# Patient Record
Sex: FEMALE | Race: BLACK OR AFRICAN AMERICAN | Employment: OTHER | URBAN - METROPOLITAN AREA
[De-identification: names, ages, dates, MRNs, and addresses within clinical notes are randomized per-mention and may not be internally consistent; named-entity substitution may affect disease eponyms.]

---

## 2021-03-15 ENCOUNTER — HOSPITAL ENCOUNTER (OUTPATIENT)
Dept: PREADMISSION TESTING | Age: 76
Discharge: HOME | End: 2021-03-15
Payer: MEDICARE

## 2021-03-15 PROCEDURE — U0003 INFECTIOUS AGENT DETECTION BY NUCLEIC ACID (DNA OR RNA); SEVERE ACUTE RESPIRATORY SYNDROME CORONAVIRUS 2 (SARS-COV-2) (CORONAVIRUS DISEASE [COVID-19]), AMPLIFIED PROBE TECHNIQUE, MAKING USE OF HIGH THROUGHPUT TECHNOLOGIES AS DESCRIBED BY CMS-2020-01-R: HCPCS

## 2021-03-16 LAB — SARS-COV-2, NAA: NOT DETECTED

## 2021-03-19 ENCOUNTER — ANESTHESIA (OUTPATIENT)
Dept: ENDOSCOPY | Age: 76
End: 2021-03-19
Payer: MEDICARE

## 2021-03-19 ENCOUNTER — HOSPITAL ENCOUNTER (OUTPATIENT)
Age: 76
Setting detail: OUTPATIENT SURGERY
Discharge: HOME | End: 2021-03-19
Attending: INTERNAL MEDICINE | Admitting: INTERNAL MEDICINE
Payer: MEDICARE

## 2021-03-19 ENCOUNTER — ANESTHESIA EVENT (OUTPATIENT)
Dept: ENDOSCOPY | Age: 76
End: 2021-03-19
Payer: MEDICARE

## 2021-03-19 VITALS
SYSTOLIC BLOOD PRESSURE: 121 MMHG | OXYGEN SATURATION: 100 % | DIASTOLIC BLOOD PRESSURE: 79 MMHG | WEIGHT: 255.5 LBS | BODY MASS INDEX: 48.24 KG/M2 | HEIGHT: 61 IN | RESPIRATION RATE: 16 BRPM | HEART RATE: 76 BPM | TEMPERATURE: 97 F

## 2021-03-19 PROBLEM — Z80.0 FAMILY HISTORY OF MALIGNANT NEOPLASM OF GASTROINTESTINAL TRACT: Status: ACTIVE | Noted: 2021-03-19

## 2021-03-19 PROCEDURE — 74011000250 HC RX REV CODE- 250: Performed by: NURSE ANESTHETIST, CERTIFIED REGISTERED

## 2021-03-19 PROCEDURE — 77030021593 HC FCPS BIOP ENDOSC BSC -A: Performed by: INTERNAL MEDICINE

## 2021-03-19 PROCEDURE — 74011000258 HC RX REV CODE- 258: Performed by: NURSE ANESTHETIST, CERTIFIED REGISTERED

## 2021-03-19 PROCEDURE — 88305 TISSUE EXAM BY PATHOLOGIST: CPT

## 2021-03-19 PROCEDURE — 74011250636 HC RX REV CODE- 250/636: Performed by: NURSE ANESTHETIST, CERTIFIED REGISTERED

## 2021-03-19 PROCEDURE — 2709999900 HC NON-CHARGEABLE SUPPLY: Performed by: INTERNAL MEDICINE

## 2021-03-19 PROCEDURE — 77030040361 HC SLV COMPR DVT MDII -B: Performed by: INTERNAL MEDICINE

## 2021-03-19 PROCEDURE — 76060000032 HC ANESTHESIA 0.5 TO 1 HR: Performed by: INTERNAL MEDICINE

## 2021-03-19 PROCEDURE — 76040000007: Performed by: INTERNAL MEDICINE

## 2021-03-19 PROCEDURE — 77030039961 HC KT CUST COLON BSC -D: Performed by: INTERNAL MEDICINE

## 2021-03-19 PROCEDURE — 74011250636 HC RX REV CODE- 250/636: Performed by: INTERNAL MEDICINE

## 2021-03-19 RX ORDER — LIDOCAINE HYDROCHLORIDE 20 MG/ML
INJECTION, SOLUTION EPIDURAL; INFILTRATION; INTRACAUDAL; PERINEURAL AS NEEDED
Status: DISCONTINUED | OUTPATIENT
Start: 2021-03-19 | End: 2021-03-19 | Stop reason: HOSPADM

## 2021-03-19 RX ORDER — SODIUM CHLORIDE 9 MG/ML
150 INJECTION, SOLUTION INTRAVENOUS CONTINUOUS
Status: DISCONTINUED | OUTPATIENT
Start: 2021-03-19 | End: 2021-03-19 | Stop reason: HOSPADM

## 2021-03-19 RX ORDER — PROPOFOL 10 MG/ML
INJECTION, EMULSION INTRAVENOUS AS NEEDED
Status: DISCONTINUED | OUTPATIENT
Start: 2021-03-19 | End: 2021-03-19 | Stop reason: HOSPADM

## 2021-03-19 RX ORDER — SODIUM CHLORIDE 9 MG/ML
INJECTION, SOLUTION INTRAVENOUS
Status: DISCONTINUED | OUTPATIENT
Start: 2021-03-19 | End: 2021-03-19 | Stop reason: HOSPADM

## 2021-03-19 RX ORDER — BIMATOPROST 0.1 MG/ML
1 SOLUTION/ DROPS OPHTHALMIC EVERY EVENING
COMMUNITY

## 2021-03-19 RX ORDER — GLYCOPYRROLATE 0.2 MG/ML
INJECTION INTRAMUSCULAR; INTRAVENOUS AS NEEDED
Status: DISCONTINUED | OUTPATIENT
Start: 2021-03-19 | End: 2021-03-19 | Stop reason: HOSPADM

## 2021-03-19 RX ADMIN — PROPOFOL 50 MG: 10 INJECTION, EMULSION INTRAVENOUS at 11:22

## 2021-03-19 RX ADMIN — PROPOFOL 50 MG: 10 INJECTION, EMULSION INTRAVENOUS at 11:32

## 2021-03-19 RX ADMIN — PROPOFOL 50 MG: 10 INJECTION, EMULSION INTRAVENOUS at 11:36

## 2021-03-19 RX ADMIN — PROPOFOL 50 MG: 10 INJECTION, EMULSION INTRAVENOUS at 11:19

## 2021-03-19 RX ADMIN — PROPOFOL 50 MG: 10 INJECTION, EMULSION INTRAVENOUS at 11:25

## 2021-03-19 RX ADMIN — GLYCOPYRROLATE 0.2 MG: 0.2 INJECTION INTRAMUSCULAR; INTRAVENOUS at 11:12

## 2021-03-19 RX ADMIN — GLYCOPYRROLATE 0.2 MG: 0.2 INJECTION INTRAMUSCULAR; INTRAVENOUS at 11:25

## 2021-03-19 RX ADMIN — PROPOFOL 50 MG: 10 INJECTION, EMULSION INTRAVENOUS at 11:28

## 2021-03-19 RX ADMIN — LIDOCAINE HYDROCHLORIDE 100 MG: 20 INJECTION, SOLUTION EPIDURAL; INFILTRATION; INTRACAUDAL; PERINEURAL at 11:17

## 2021-03-19 RX ADMIN — SODIUM CHLORIDE 150 ML/HR: 900 INJECTION, SOLUTION INTRAVENOUS at 10:42

## 2021-03-19 RX ADMIN — SODIUM CHLORIDE: 900 INJECTION, SOLUTION INTRAVENOUS at 11:06

## 2021-03-19 NOTE — DISCHARGE INSTRUCTIONS
High fiber diet  Resume prior meds  No follow up colonoscopy recommended   jde      DISCHARGE SUMMARY from Nurse    The following personal items collected during your admission are returned to you:   Dental Appliance: Dental Appliances: Lowers, Uppers, With patient  Vision: Visual Aid: Glasses, At home  Hearing Aid:    Jewelry:    Clothing:    Other Valuables:    Valuables sent to safe:              PATIENT INSTRUCTIONS:    After general anesthesia or intravenous sedation, for 24 hours or while taking prescription Narcotics:  · Limit your activities  · Do not drive and operate hazardous machinery  · Do not make important personal or business decisions  · Do  not drink alcoholic beverages  · If you have not urinated within 8 hours after discharge, please contact your surgeon on call. Report the following to your surgeon:  · Excessive pain, swelling, redness or odor of or around the surgical area  · Temperature over 100.5  · Nausea and vomiting lasting longer than 4 hours or if unable to take medications  · Any signs of decreased circulation or nerve impairment to extremity: change in color, persistent  numbness, tingling, coldness or increase pain  · Any questions      Follow-up Appointments   Procedures    FOLLOW UP VISIT Appointment in: Other (Specify) prn     prn     Standing Status:   Standing     Number of Occurrences:   1     Order Specific Question:   Appointment in     Answer: Other (Specify)       What to do at Home:  Recommended activity: as tolerated,     If you experience any of the following symptoms as above, please follow up with Dr. Edil Waller. *  Please give a list of your current medications to your Primary Care Provider. *  Please update this list whenever your medications are discontinued, doses are      changed, or new medications (including over-the-counter products) are added. *  Please carry medication information at all times in case of emergency situations.           These are general instructions for a healthy lifestyle:    No smoking/ No tobacco products/ Avoid exposure to second hand smoke    Surgeon General's Warning:  Quitting smoking now greatly reduces serious risk to your health. Obesity, smoking, and sedentary lifestyle greatly increases your risk for illness    A healthy diet, regular physical exercise & weight monitoring are important for maintaining a healthy lifestyle    You may be retaining fluid if you have a history of heart failure or if you experience any of the following symptoms:  Weight gain of 3 pounds or more overnight or 5 pounds in a week, increased swelling in our hands or feet or shortness of breath while lying flat in bed. Please call your doctor as soon as you notice any of these symptoms; do not wait until your next office visit. Recognize signs and symptoms of STROKE:    F-face looks uneven    A-arms unable to move or move unevenly    S-speech slurred or non-existent    T-time-call 911 as soon as signs and symptoms begin-DO NOT go       Back to bed or wait to see if you get better-TIME IS BRAIN. The discharge information has been reviewed with the patient and caregiver. The patient and caregiver verbalized understanding. Warning Signs of HEART ATTACK     Call 911 if you have these symptoms:   Chest discomfort. Most heart attacks involve discomfort in the center of the chest that lasts more than a few minutes, or that goes away and comes back. It can feel like uncomfortable pressure, squeezing, fullness, or pain.  Discomfort in other areas of the upper body. Symptoms can include pain or discomfort in one or both arms, the back, neck, jaw, or stomach.  Shortness of breath with or without chest discomfort.  Other signs may include breaking out in a cold sweat, nausea, or lightheadedness. Don't wait more than five minutes to call 911 - MINUTES MATTER! Fast action can save your life.  Calling 911 is almost always the fastest way to get lifesaving treatment. Emergency Medical Services staff can begin treatment when they arrive -- up to an hour sooner than if someone gets to the hospital by car. The discharge information has been reviewed with the patient and caregiver. The patient and caregiver verbalized understanding. Discharge medications reviewed with the patient and guardian and appropriate educational materials and side effects teaching were provided.     Patient armband removed and shredded

## 2021-03-19 NOTE — ANESTHESIA POSTPROCEDURE EVALUATION
Procedure(s):  COLONOSCOPY WITH MAC; POLYPECTOMY. MAC    Anesthesia Post Evaluation      Multimodal analgesia: multimodal analgesia used between 6 hours prior to anesthesia start to PACU discharge  Patient location during evaluation: bedside  Patient participation: complete - patient participated  Level of consciousness: awake  Pain management: adequate  Airway patency: patent  Anesthetic complications: no  Cardiovascular status: acceptable  Respiratory status: acceptable  Hydration status: acceptable  Post anesthesia nausea and vomiting:  none  Final Post Anesthesia Temperature Assessment:  Normothermia (36.0-37.5 degrees C)      INITIAL Post-op Vital signs: No vitals data found for the desired time range.

## 2021-03-19 NOTE — ANESTHESIA PREPROCEDURE EVALUATION
Relevant Problems   No relevant active problems       Anesthetic History   No history of anesthetic complications     Pertinent negatives: No PONV       Review of Systems / Medical History  Patient summary reviewed, nursing notes reviewed and pertinent labs reviewed    Pulmonary  Within defined limits            Pertinent negatives: No COPD, asthma and sleep apnea     Neuro/Psych   Within defined limits        Pertinent negatives: No seizures and CVA   Cardiovascular    Hypertension            Pertinent negatives: No past MI and CHF  Exercise tolerance: >4 METS     GI/Hepatic/Renal  Within defined limits           Pertinent negatives: No liver disease and renal disease   Endo/Other        Morbid obesity and arthritis     Other Findings              Physical Exam    Airway  Mallampati: III  TM Distance: 4 - 6 cm  Neck ROM: normal range of motion   Mouth opening: Normal     Cardiovascular    Rhythm: regular  Rate: normal         Dental  No notable dental hx       Pulmonary  Breath sounds clear to auscultation               Abdominal  GI exam deferred       Other Findings            Anesthetic Plan    ASA: 3  Anesthesia type: MAC          Induction: Intravenous  Anesthetic plan and risks discussed with: Patient

## 2021-03-19 NOTE — PROCEDURES
[facility] [place]  PROCEDURE NOTE    Name:  Lee Carr  MR#:   [id number]  :  1945  ACCOUNT #:  [de-identified]  DATE OF SERVICE:  2021    PREOPERATIVE DIAGNOSIS:  Family history of colon cancer in the patient's mother in her 46s. POSTOPERATIVE DIAGNOSES:  Colon polyp, diverticulosis, and colon lipomas. PROCEDURES PERFORMED:  Colonoscopy and biopsy. SURGEON/ENDOSCOPIST:  Al Huggins MD, Geovanna Queen    ASSISTANT:  Aníbal Graves. ANESTHESIA:  Propofol based, given by delphine Sun CRNA.    ESTIMATED BLOOD LOSS:  Nil. SPECIMENS REMOVED:  Colon polyp. COMPLICATIONS:  None. IMPLANTS:  None. PROCEDURE IN DETAIL:  Colonoscopy. With informed consent obtained, placed on the chart, the patient was placed in the left lateral decubitus position. Oxygen was administered supplementally. A mild drowsy state was induced and maintained with the propofol-based sedation given by delphine Sun CRNA. After an unremarkable digital rectal exam, the Olympus 190 series Pediatric colonoscope was passed through the anus and advanced under direct visualization to the cecum where landmarks were identified and photographed for documentation. Prep was adequate with significant simethicone needed to clear copious bubbles. The cecum itself was normal.  On the fold immediately opposite to the ileocecal valve, there was the first of two large lipomas, this perhaps 2.5-3 cm. This indented easily with forceps and even water jet for a soft \"pillow sign. \"  Seabron Sneddon was taken. The scope was slowly withdrawn through the colon wherein the distal ascending colon there was a 3-4 mm flat polyp removed with cold biopsy forceps and submitted to Pathology. In the transverse colon was a second, even a bit larger, but still softer lipoma. This almost looked like it could act as a lead point for intussusception potentially. Photograph was taken. Note, the patient was completely asymptomatic from this standpoint.   The diverticular disease began in the proximal left colon and accelerated into the sigmoid colon with some deformity and angulation. Representative photos were taken. Forward and retroflexed views of the rectum revealed small internal hemorrhoids. IMPRESSION:  1. Colon polyp. 2.  Family history of colon cancer in the patient's mother. 3.  Two lipomas as above. 4.  Severe sigmoid diverticular disease. 5.  Small internal hemorrhoids. PLAN:  1. Follow up pathology. 2.  High-fiber diet. 3. In light of the patient's age and significant comorbidities, this would reasonably represent the patient's last elective colonoscopy. 4.  Office followup with me otherwise p.r.n.       Florence Nieves MD      [id number]  D:  03/19/2021 11:48  T:  03/19/2021 19:41  JOB #:  [id number]

## 2022-03-19 PROBLEM — Z80.0 FAMILY HISTORY OF MALIGNANT NEOPLASM OF GASTROINTESTINAL TRACT: Status: ACTIVE | Noted: 2021-03-19

## 2025-07-18 ENCOUNTER — TRANSCRIBE ORDERS (OUTPATIENT)
Facility: HOSPITAL | Age: 80
End: 2025-07-18

## 2025-07-18 DIAGNOSIS — R93.1 ABNORMAL FINDINGS ON DIAGNOSTIC IMAGING OF HEART AND CORONARY CIRCULATION: ICD-10-CM

## 2025-07-18 DIAGNOSIS — R07.2 PRECORDIAL PAIN: Primary | ICD-10-CM

## 2025-07-28 ENCOUNTER — HOSPITAL ENCOUNTER (OUTPATIENT)
Facility: HOSPITAL | Age: 80
Discharge: HOME | End: 2025-07-30
Attending: INTERNAL MEDICINE
Payer: MEDICARE

## 2025-07-28 ENCOUNTER — HOSPITAL ENCOUNTER (OUTPATIENT)
Facility: HOSPITAL | Age: 80
Discharge: HOME | End: 2025-07-31
Attending: INTERNAL MEDICINE
Payer: MEDICARE

## 2025-07-28 DIAGNOSIS — R07.2 PRECORDIAL PAIN: ICD-10-CM

## 2025-07-28 DIAGNOSIS — R93.1 ABNORMAL FINDINGS ON DIAGNOSTIC IMAGING OF HEART AND CORONARY CIRCULATION: ICD-10-CM

## 2025-07-28 LAB
ECHO AO ROOT DIAM: 3.2 CM
ECHO AV AREA PEAK VELOCITY: 2.5 CM2
ECHO AV AREA VTI: 2.5 CM2
ECHO AV MEAN GRADIENT: 4 MMHG
ECHO AV MEAN VELOCITY: 1 M/S
ECHO AV PEAK GRADIENT: 8 MMHG
ECHO AV PEAK VELOCITY: 1.4 M/S
ECHO AV VELOCITY RATIO: 0.86
ECHO AV VTI: 33.2 CM
ECHO EST RA PRESSURE: 3 MMHG
ECHO LA DIAMETER: 4.1 CM
ECHO LA TO AORTIC ROOT RATIO: 1.28
ECHO LA VOL A-L A2C: 83 ML (ref 22–52)
ECHO LA VOL A-L A4C: 82 ML (ref 22–52)
ECHO LA VOL BP: 79 ML (ref 22–52)
ECHO LA VOL MOD A2C: 79 ML (ref 22–52)
ECHO LA VOL MOD A4C: 79 ML (ref 22–52)
ECHO LA VOLUME AREA LENGTH: 83 ML
ECHO LV E' LATERAL VELOCITY: 6.47 CM/S
ECHO LV E' SEPTAL VELOCITY: 5.13 CM/S
ECHO LV EDV A2C: 96 ML
ECHO LV EDV A4C: 96 ML
ECHO LV EDV BP: 98 ML (ref 56–104)
ECHO LV EF PHYSICIAN: 60 %
ECHO LV EJECTION FRACTION A2C: 59 %
ECHO LV EJECTION FRACTION A4C: 63 %
ECHO LV EJECTION FRACTION BIPLANE: 60 % (ref 55–100)
ECHO LV ESV A2C: 39 ML
ECHO LV ESV A4C: 35 ML
ECHO LV ESV BP: 39 ML (ref 19–49)
ECHO LV FRACTIONAL SHORTENING: 30 % (ref 28–44)
ECHO LV INTERNAL DIMENSION DIASTOLIC: 4.4 CM (ref 3.9–5.3)
ECHO LV INTERNAL DIMENSION SYSTOLIC: 3.1 CM
ECHO LV IVSD: 1.2 CM (ref 0.6–0.9)
ECHO LV MASS 2D: 180 G (ref 67–162)
ECHO LV POSTERIOR WALL DIASTOLIC: 1.1 CM (ref 0.6–0.9)
ECHO LV RELATIVE WALL THICKNESS RATIO: 0.5
ECHO LVOT AREA: 3.1 CM2
ECHO LVOT AV VTI INDEX: 0.8
ECHO LVOT DIAM: 2 CM
ECHO LVOT MEAN GRADIENT: 2 MMHG
ECHO LVOT PEAK GRADIENT: 5 MMHG
ECHO LVOT PEAK VELOCITY: 1.2 M/S
ECHO LVOT SV: 83.2 ML
ECHO LVOT VTI: 26.5 CM
ECHO MV A VELOCITY: 0.84 M/S
ECHO MV E DECELERATION TIME (DT): 239.8 MS
ECHO MV E VELOCITY: 0.55 M/S
ECHO MV E/A RATIO: 0.65
ECHO MV E/E' LATERAL: 8.5
ECHO MV E/E' RATIO (AVERAGED): 9.61
ECHO MV E/E' SEPTAL: 10.72
ECHO PULMONARY ARTERY SYSTOLIC PRESSURE (PASP): 33 MMHG
ECHO RA VOLUME: 42 ML
ECHO RIGHT VENTRICULAR SYSTOLIC PRESSURE (RVSP): 33 MMHG
ECHO RV INTERNAL DIMENSION: 3.7 CM
ECHO RV TAPSE: 2.1 CM (ref 1.7–?)
ECHO TV REGURGITANT MAX VELOCITY: 2.73 M/S
ECHO TV REGURGITANT PEAK GRADIENT: 30 MMHG
NUC STRESS EJECTION FRACTION: 69 %
STRESS BASELINE DIAS BP: 106 MMHG
STRESS BASELINE HR: 55 BPM
STRESS BASELINE SYS BP: 192 MMHG
STRESS ESTIMATED WORKLOAD: 1 METS
STRESS PEAK DIAS BP: 106 MMHG
STRESS PEAK SYS BP: 192 MMHG
STRESS PERCENT HR ACHIEVED: 66 %
STRESS POST PEAK HR: 91 BPM
STRESS RATE PRESSURE PRODUCT: NORMAL BPM*MMHG
STRESS TARGET HR: 138 BPM
TID: 1.1

## 2025-07-28 PROCEDURE — A9500 TC99M SESTAMIBI: HCPCS | Performed by: INTERNAL MEDICINE

## 2025-07-28 PROCEDURE — 78452 HT MUSCLE IMAGE SPECT MULT: CPT

## 2025-07-28 PROCEDURE — 93306 TTE W/DOPPLER COMPLETE: CPT

## 2025-07-28 PROCEDURE — 3430000000 HC RX DIAGNOSTIC RADIOPHARMACEUTICAL: Performed by: INTERNAL MEDICINE

## 2025-07-28 PROCEDURE — 93017 CV STRESS TEST TRACING ONLY: CPT

## 2025-07-28 PROCEDURE — 6360000002 HC RX W HCPCS: Performed by: INTERNAL MEDICINE

## 2025-07-28 RX ORDER — REGADENOSON 0.08 MG/ML
0.4 INJECTION, SOLUTION INTRAVENOUS
Status: COMPLETED | OUTPATIENT
Start: 2025-07-28 | End: 2025-07-28

## 2025-07-28 RX ORDER — TETRAKIS(2-METHOXYISOBUTYLISOCYANIDE)COPPER(I) TETRAFLUOROBORATE 1 MG/ML
8.5 INJECTION, POWDER, LYOPHILIZED, FOR SOLUTION INTRAVENOUS
Status: COMPLETED | OUTPATIENT
Start: 2025-07-28 | End: 2025-07-28

## 2025-07-28 RX ORDER — TETRAKIS(2-METHOXYISOBUTYLISOCYANIDE)COPPER(I) TETRAFLUOROBORATE 1 MG/ML
26.3 INJECTION, POWDER, LYOPHILIZED, FOR SOLUTION INTRAVENOUS
Status: COMPLETED | OUTPATIENT
Start: 2025-07-28 | End: 2025-07-28

## 2025-07-28 RX ADMIN — REGADENOSON 0.4 MG: 0.08 INJECTION, SOLUTION INTRAVENOUS at 10:52

## 2025-07-28 RX ADMIN — TETRAKIS(2-METHOXYISOBUTYLISOCYANIDE)COPPER(I) TETRAFLUOROBORATE 26.3 MILLICURIE: 1 INJECTION, POWDER, LYOPHILIZED, FOR SOLUTION INTRAVENOUS at 10:52

## 2025-07-28 RX ADMIN — TETRAKIS(2-METHOXYISOBUTYLISOCYANIDE)COPPER(I) TETRAFLUOROBORATE 8.5 MILLICURIE: 1 INJECTION, POWDER, LYOPHILIZED, FOR SOLUTION INTRAVENOUS at 09:30

## (undated) DEVICE — FORCEPS BX CAP 240CM L RAD JAW 4

## (undated) DEVICE — NDL FLTR TIP 5 MIC 18GX1.5IN --

## (undated) DEVICE — CATH SUC CTRL PRT TRIFLO 14FR --

## (undated) DEVICE — MAJ-1414 SINGLE USE ADPATER BIOPSY VALV: Brand: SINGLE USE ADAPTOR BIOPSY VALVE

## (undated) DEVICE — Device

## (undated) DEVICE — TRAP SPEC COLL POLYP POLYSTYR --

## (undated) DEVICE — SPONGE GZ W4XL4IN RAYON POLY 4 PLY NONWOVEN FASTER WICKING

## (undated) DEVICE — NDL PRT INJ NSAF BLNT 18GX1.5 --

## (undated) DEVICE — SYRINGE 50ML E/T

## (undated) DEVICE — KENDALL RADIOLUCENT FOAM MONITORING ELECTRODE RECTANGULAR SHAPE: Brand: KENDALL

## (undated) DEVICE — SINGLE PORT MANIFOLD: Brand: NEPTUNE 2

## (undated) DEVICE — GARMENT,MEDLINE,DVT,INT,CALF,MED, GEN2: Brand: MEDLINE

## (undated) DEVICE — SYR 3ML LL TIP 1/10ML GRAD --

## (undated) DEVICE — SYR 5ML 1/5 GRAD LL NSAF LF --

## (undated) DEVICE — WRISTBAND ID AD W2.5XL9.5CM RED VYN ADH CLSR UNI-PRINT

## (undated) DEVICE — SPONGE GZ W4XL4IN COT 12 PLY TYP VII WVN C FLD DSGN

## (undated) DEVICE — TUBING, SUCTION, 1/4" X 12', STRAIGHT: Brand: MEDLINE

## (undated) DEVICE — SET ADMIN 16ML TBNG L100IN 2 Y INJ SITE IV PIGGY BK DISP

## (undated) DEVICE — SOLUTION IV 500ML 0.9% SOD CHL FLX CONT

## (undated) DEVICE — CANNULA CUSH AD W/ 14FT TBG

## (undated) DEVICE — CATH IV SAFE STR 22GX1IN BLU -- PROTECTIV PLUS

## (undated) DEVICE — TRNQT TEXT 1X18IN BLU LF DISP -- CONVERT TO ITEM 362165